# Patient Record
Sex: MALE | Race: WHITE | NOT HISPANIC OR LATINO | Employment: FULL TIME | URBAN - METROPOLITAN AREA
[De-identification: names, ages, dates, MRNs, and addresses within clinical notes are randomized per-mention and may not be internally consistent; named-entity substitution may affect disease eponyms.]

---

## 2022-11-15 ENCOUNTER — OFFICE VISIT (OUTPATIENT)
Dept: URGENT CARE | Facility: CLINIC | Age: 57
End: 2022-11-15
Payer: COMMERCIAL

## 2022-11-15 VITALS
RESPIRATION RATE: 16 BRPM | OXYGEN SATURATION: 99 % | HEIGHT: 69 IN | WEIGHT: 145 LBS | TEMPERATURE: 98 F | HEART RATE: 81 BPM | BODY MASS INDEX: 21.48 KG/M2 | SYSTOLIC BLOOD PRESSURE: 133 MMHG | DIASTOLIC BLOOD PRESSURE: 77 MMHG

## 2022-11-15 DIAGNOSIS — K40.90 NON-RECURRENT UNILATERAL INGUINAL HERNIA WITHOUT OBSTRUCTION OR GANGRENE: Primary | ICD-10-CM

## 2022-11-15 PROCEDURE — 99203 OFFICE O/P NEW LOW 30 MIN: CPT | Mod: ,,, | Performed by: FAMILY MEDICINE

## 2022-11-15 PROCEDURE — 99203 PR OFFICE/OUTPT VISIT, NEW, LEVL III, 30-44 MIN: ICD-10-PCS | Mod: ,,, | Performed by: FAMILY MEDICINE

## 2022-11-15 NOTE — PROGRESS NOTES
"Subjective:       Patient ID: Joe Bower is a 57 y.o. male.    Vitals:  height is 5' 9" (1.753 m) and weight is 65.8 kg (145 lb). His oral temperature is 98.2 °F (36.8 °C). His blood pressure is 133/77 and his pulse is 81. His respiration is 16 and oxygen saturation is 99%.     Chief Complaint: hernia on right side    Inguinal Hernia on right side about 8-9 mths.   ROS    Objective:      Physical Exam      Assessment:       No diagnosis found.      Plan:         There are no diagnoses linked to this encounter.                 "

## 2022-11-15 NOTE — PROGRESS NOTES
"Subjective:       Patient ID: Joe Bower is a 57 y.o. male.    Vitals:  height is 5' 9" (1.753 m) and weight is 65.8 kg (145 lb). His oral temperature is 98.2 °F (36.8 °C). His blood pressure is 133/77 and his pulse is 81. His respiration is 16 and oxygen saturation is 99%.     Chief Complaint: Hernia (Right Inguinal Hernia )    57-year-old male presents to clinic complaining of a likely hernia right inguinal area.  States he 1st noticed it back in March or April.  States it has been getting larger in size.  Patient states he can easily reduce it but it returns very quickly especially with coughing heavy lifting or bearing down.  Denies any other symptoms      Constitution: Negative.   HENT: Negative.     Neck: neck negative.   Cardiovascular: Negative.    Eyes: Negative.    Respiratory: Negative.     Gastrointestinal: Negative.    Genitourinary: Negative.    Musculoskeletal: Negative.    Skin: Negative.    Allergic/Immunologic: Negative.    Neurological: Negative.    Hematologic/Lymphatic: Negative.      Objective:      Physical Exam   Constitutional: He is oriented to person, place, and time.  Non-toxic appearance. He does not appear ill. No distress.   Abdominal: Normal appearance. A hernia (right inguinal area.  It is reducible.) is present.   Neurological: He is alert and oriented to person, place, and time.   Skin: Skin is not diaphoretic.   Psychiatric: His behavior is normal. Mood, judgment and thought content normal.   Vitals reviewed.         Previous History      Review of patient's allergies indicates:  No Known Allergies    Past Medical History:   Diagnosis Date    Known health problems: none      No current outpatient medications  Past Surgical History:   Procedure Laterality Date    NO PAST SURGERIES       Family History   Problem Relation Age of Onset    Rheum arthritis Mother     Cancer Father        Social History     Tobacco Use    Smoking status: Every Day     Types: Cigarettes    Smokeless " "tobacco: Never   Substance Use Topics    Alcohol use: Yes     Alcohol/week: 2.0 standard drinks     Types: 2 Glasses of wine per week        Physical Exam      Vital Signs Reviewed   /77   Pulse 81   Temp 98.2 °F (36.8 °C) (Oral)   Resp 16   Ht 5' 9" (1.753 m)   Wt 65.8 kg (145 lb)   SpO2 99%   BMI 21.41 kg/m²        Procedures    Procedures     Labs   No results found for this or any previous visit.    Assessment:       1. Non-recurrent unilateral inguinal hernia without obstruction or gangrene            Plan:       You have been referred to a general surgeon.  Their office will call you for an appointment.  As discussed, if you have vomiting, increasing pain in the area, or unable to have a bowel movement, seek medical attention immediately  Non-recurrent unilateral inguinal hernia without obstruction or gangrene  -     Ambulatory referral/consult to General Surgery                   "

## 2022-11-15 NOTE — PATIENT INSTRUCTIONS
You have been referred to a general surgeon.  Their office will call you for an appointment.  As discussed, if you have vomiting, increasing pain in the area, or unable to have a bowel movement, seek medical attention immediately

## 2022-11-28 ENCOUNTER — TELEPHONE (OUTPATIENT)
Dept: URGENT CARE | Facility: CLINIC | Age: 57
End: 2022-11-28

## 2022-11-28 NOTE — TELEPHONE ENCOUNTER
Dr. Beckwith office called in reference to General Surgery referral , they are not in network w/ patient's insurance.     I called pt to ask him to contact his insurance to get a General Surgeon in network, once he provides that information we will resend referral .     No answer - left voicemail requesting call back